# Patient Record
Sex: MALE | Race: WHITE | NOT HISPANIC OR LATINO | URBAN - METROPOLITAN AREA
[De-identification: names, ages, dates, MRNs, and addresses within clinical notes are randomized per-mention and may not be internally consistent; named-entity substitution may affect disease eponyms.]

---

## 2020-07-14 ENCOUNTER — TELEPHONE (OUTPATIENT)
Dept: PHYSICAL THERAPY | Facility: CLINIC | Age: 30
End: 2020-07-14

## 2021-01-13 ENCOUNTER — APPOINTMENT (OUTPATIENT)
Dept: GENERAL RADIOLOGY | Facility: HOSPITAL | Age: 31
End: 2021-01-13

## 2021-01-13 ENCOUNTER — HOSPITAL ENCOUNTER (EMERGENCY)
Facility: HOSPITAL | Age: 31
Discharge: HOME | End: 2021-01-13
Attending: EMERGENCY MEDICINE | Admitting: EMERGENCY MEDICINE

## 2021-01-13 VITALS
HEIGHT: 74 IN | RESPIRATION RATE: 14 BRPM | HEART RATE: 70 BPM | SYSTOLIC BLOOD PRESSURE: 126 MMHG | OXYGEN SATURATION: 96 % | BODY MASS INDEX: 33.5 KG/M2 | DIASTOLIC BLOOD PRESSURE: 60 MMHG | WEIGHT: 261 LBS | TEMPERATURE: 98 F

## 2021-01-13 DIAGNOSIS — Z77.9 EXPOSURE TO RESPIRATORY IRRITANT: ICD-10-CM

## 2021-01-13 DIAGNOSIS — J40 BRONCHITIS: Primary | ICD-10-CM

## 2021-01-13 LAB
BASOPHILS # BLD AUTO: 0.1 10*3/MM3 (ref 0–0.2)
BASOPHILS NFR BLD AUTO: 0.4 % (ref 0–1.5)
DEPRECATED RDW RBC AUTO: 38.9 FL (ref 37–54)
EOSINOPHIL # BLD AUTO: 0.1 10*3/MM3 (ref 0–0.4)
EOSINOPHIL NFR BLD AUTO: 0.8 % (ref 0.3–6.2)
ERYTHROCYTE [DISTWIDTH] IN BLOOD BY AUTOMATED COUNT: 12.6 % (ref 12.3–15.4)
HCT VFR BLD AUTO: 44 % (ref 37.5–51)
HGB BLD-MCNC: 15.2 G/DL (ref 13–17.7)
LYMPHOCYTES # BLD AUTO: 1.5 10*3/MM3 (ref 0.7–3.1)
LYMPHOCYTES NFR BLD AUTO: 12.2 % (ref 19.6–45.3)
MCH RBC QN AUTO: 30.9 PG (ref 26.6–33)
MCHC RBC AUTO-ENTMCNC: 34.5 G/DL (ref 31.5–35.7)
MCV RBC AUTO: 89.6 FL (ref 79–97)
MONOCYTES # BLD AUTO: 1 10*3/MM3 (ref 0.1–0.9)
MONOCYTES NFR BLD AUTO: 8.3 % (ref 5–12)
NEUTROPHILS NFR BLD AUTO: 78.3 % (ref 42.7–76)
NEUTROPHILS NFR BLD AUTO: 9.5 10*3/MM3 (ref 1.7–7)
NRBC BLD AUTO-RTO: 0.1 /100 WBC (ref 0–0.2)
PLATELET # BLD AUTO: 306 10*3/MM3 (ref 140–450)
PMV BLD AUTO: 7.6 FL (ref 6–12)
RBC # BLD AUTO: 4.91 10*6/MM3 (ref 4.14–5.8)
SARS-COV-2 RNA PNL SPEC NAA+PROBE: NOT DETECTED
WBC # BLD AUTO: 12.2 10*3/MM3 (ref 3.4–10.8)
WHOLE BLOOD HOLD SPECIMEN: NORMAL
WHOLE BLOOD HOLD SPECIMEN: NORMAL

## 2021-01-13 PROCEDURE — 93005 ELECTROCARDIOGRAM TRACING: CPT

## 2021-01-13 PROCEDURE — 99283 EMERGENCY DEPT VISIT LOW MDM: CPT

## 2021-01-13 PROCEDURE — 96374 THER/PROPH/DIAG INJ IV PUSH: CPT

## 2021-01-13 PROCEDURE — 85025 COMPLETE CBC W/AUTO DIFF WBC: CPT | Performed by: EMERGENCY MEDICINE

## 2021-01-13 PROCEDURE — 93005 ELECTROCARDIOGRAM TRACING: CPT | Performed by: EMERGENCY MEDICINE

## 2021-01-13 PROCEDURE — 94640 AIRWAY INHALATION TREATMENT: CPT

## 2021-01-13 PROCEDURE — 87635 SARS-COV-2 COVID-19 AMP PRB: CPT | Performed by: EMERGENCY MEDICINE

## 2021-01-13 PROCEDURE — 25010000002 METHYLPREDNISOLONE PER 125 MG: Performed by: EMERGENCY MEDICINE

## 2021-01-13 PROCEDURE — 71045 X-RAY EXAM CHEST 1 VIEW: CPT

## 2021-01-13 RX ORDER — METHYLPREDNISOLONE SODIUM SUCCINATE 125 MG/2ML
125 INJECTION, POWDER, LYOPHILIZED, FOR SOLUTION INTRAMUSCULAR; INTRAVENOUS ONCE
Status: COMPLETED | OUTPATIENT
Start: 2021-01-13 | End: 2021-01-13

## 2021-01-13 RX ORDER — PREDNISONE 10 MG/1
30 TABLET ORAL DAILY
Qty: 15 TABLET | Refills: 0 | Status: SHIPPED | OUTPATIENT
Start: 2021-01-13

## 2021-01-13 RX ORDER — IPRATROPIUM BROMIDE AND ALBUTEROL SULFATE 2.5; .5 MG/3ML; MG/3ML
3 SOLUTION RESPIRATORY (INHALATION) ONCE
Status: DISCONTINUED | OUTPATIENT
Start: 2021-01-13 | End: 2021-01-13

## 2021-01-13 RX ORDER — ALBUTEROL SULFATE 90 UG/1
2 AEROSOL, METERED RESPIRATORY (INHALATION) EVERY 6 HOURS PRN
Qty: 8 G | Refills: 0 | Status: SHIPPED | OUTPATIENT
Start: 2021-01-13

## 2021-01-13 RX ORDER — PREDNISONE 10 MG/1
30 TABLET ORAL DAILY
Qty: 15 TABLET | Refills: 0 | Status: SHIPPED | OUTPATIENT
Start: 2021-01-13 | End: 2021-01-13 | Stop reason: SDUPTHER

## 2021-01-13 RX ORDER — ALBUTEROL SULFATE 90 UG/1
2 AEROSOL, METERED RESPIRATORY (INHALATION) ONCE
Status: COMPLETED | OUTPATIENT
Start: 2021-01-13 | End: 2021-01-13

## 2021-01-13 RX ORDER — AZITHROMYCIN 500 MG/1
500 TABLET, FILM COATED ORAL DAILY
Qty: 3 TABLET | Refills: 0 | Status: SHIPPED | OUTPATIENT
Start: 2021-01-13

## 2021-01-13 RX ADMIN — ALBUTEROL SULFATE 2 PUFF: 90 AEROSOL, METERED RESPIRATORY (INHALATION) at 20:18

## 2021-01-13 RX ADMIN — METHYLPREDNISOLONE SODIUM SUCCINATE 125 MG: 125 INJECTION, POWDER, FOR SOLUTION INTRAMUSCULAR; INTRAVENOUS at 19:57

## 2021-01-14 NOTE — ED PROVIDER NOTES
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  He states he has developed a cough productive of brown sputum.  He reports no blood.  He reports he has had subjective fever and chills today.  He states he has had some wheezing.  He states he has had similar symptoms in the past with dust exposure.  He reports that his had no leg pain or swelling.  He states he does not have pleuritic chest discomfort.  Reports no change in taste or smell.  He denies diarrhea          Review of Systems   HENT: Negative for sore throat and trouble swallowing.    Respiratory: Positive for cough, shortness of breath and wheezing.    Cardiovascular: Negative for palpitations and leg swelling.   Allergic/Immunologic: Negative for immunocompromised state.   Hematological: Bruises/bleeds easily.   All other systems reviewed and are negative.      No past medical history on file.    No Known Allergies    Past Surgical History:   Procedure Laterality Date   • ABDOMINAL SURGERY     • HERNIA REPAIR         No family history on file.    Social History     Socioeconomic History   • Marital status:      Spouse name: Not on file   • Number of children: Not on file   • Years of education: Not on file   • Highest education level: Not on file   Tobacco Use   • Smoking status: Never Smoker   • Smokeless tobacco: Never Used   Substance and Sexual Activity   • Alcohol use: Not Currently   • Drug use: Defer   • Sexual activity: Defer           Objective   Physical Exam  Alert Ovid Coma Scale 15   HEENT: Pupils equal and reactive to light. Conjunctivae are not injected. normal tympanic membranes. Oropharynx and nares are normal.   Neck: Supple. Midline trachea. No JVD. No goiter.   Chest: Extensive rhonchi bilaterally and equal breath sounds bilaterally regular rate and rhythm without murmur or rub.   Abdomen: Positive bowel sounds nontender nondistended. No rebound or peritoneal signs.  No CVA tenderness.   Extremities no clubbing cyanosis or edema motor sensory exam is normal the full range of motion is intact   skin: Warm and dry, no rashes or petechia.   Lymphatic: No regional lymphadenopathy. No calf pain, swelling or Lc's sign    Procedures           ED Course  ED Course as of Jan 13 2121 Wed Jan 13, 2021 2116 I examined the patient using the appropriate personal protective equipment.          [TH]      ED Course User Index  [TH] Karl Vega MD                                   Labs Reviewed   CBC WITH AUTO DIFFERENTIAL - Abnormal; Notable for the following components:       Result Value    WBC 12.20 (*)     Neutrophil % 78.3 (*)     Lymphocyte % 12.2 (*)     Neutrophils, Absolute 9.50 (*)     Monocytes, Absolute 1.00 (*)     All other components within normal limits   COVID-19,[facility] NO TRANSPORT MEDIA 2 HR TAT - Normal    Narrative:     Fact sheet for providers: https://www.fda.gov/media/809892/download     Fact sheet for patients: https://www.fda.gov/media/477333/download    Test performed by PCR.    Consider negative results in combination with clinical observations, patient history, and epidemiological information.   RAINBOW DRAW    Narrative:     The following orders were created for panel order [place] Draw.  Procedure                               Abnormality         Status                     ---------                               -----------         ------                     Light Blue Top[[id number]]                                   Final result               Green Top (Gel)[[id number]]                                  Final result               Lavender Top[[id number]]                                     Final result               Gold Top - SST[[id number]]                                   Final result                 Please view results for these tests on the individual orders.   LIGHT BLUE TOP   GREEN TOP   LAVENDER TOP   GOLD TOP - SST   CBC  AND DIFFERENTIAL    Narrative:     The following orders were created for panel order CBC & Differential.  Procedure                               Abnormality         Status                     ---------                               -----------         ------                     CBC Auto Differential[[id number]]        Abnormal            Final result                 Please view results for these tests on the individual orders.     Medications   cefTRIAXone (ROCEPHIN) in FI 1 gram/10ml IV PUSH syringe (has no administration in time range)   methylPREDNISolone sodium succinate (SOLU-Medrol) injection 125 mg (125 mg Intravenous Given 1/13/21 1957)   albuterol sulfate HFA (PROVENTIL HFA;VENTOLIN HFA;PROAIR HFA) inhaler 2 puff (2 puffs Inhalation Given 1/13/21 2018)     Xr Chest 1 View    Result Date: 1/13/2021  1.  No evidence of active disease.   Electronically Signed By-Lizet Wall MD On:1/13/2021 8:17 PM This report was finalized on [id number] by  Lizet Wall MD.            MDM  Number of Diagnoses or Management Options     Amount and/or Complexity of Data Reviewed  Clinical lab tests: reviewed  Tests in the radiology section of CPT®: reviewed  Independent visualization of images, tracings, or specimens: yes    Risk of Complications, Morbidity, and/or Mortality  Presenting problems: high  Diagnostic procedures: high  Management options: high  General comments: Patient be discharged with a prescription albuterol inhaler, prednisone, Zithromax.  The patient was stable at discharge and vocalized understanding of discharge instructions and warnings        Final diagnoses:   Bronchitis   Exposure to respiratory irritant            Karl Vega MD  01/13/21 2121

## 2021-01-14 NOTE — DISCHARGE INSTRUCTIONS
Rest next 2 days, no work  Avoid cold exposure next 2 days  Plenty of fluids  Tylenol or ibuprofen for any fever and discomfort  Medication as directed

## 2021-01-15 LAB — QT INTERVAL: 379 MS

## 2024-06-09 ENCOUNTER — APPOINTMENT (OUTPATIENT)
Dept: GENERAL RADIOLOGY | Facility: HOSPITAL | Age: 34
End: 2024-06-09
Payer: COMMERCIAL

## 2024-06-09 ENCOUNTER — HOSPITAL ENCOUNTER (EMERGENCY)
Facility: HOSPITAL | Age: 34
Discharge: HOME | End: 2024-06-10
Admitting: EMERGENCY MEDICINE
Payer: COMMERCIAL

## 2024-06-09 VITALS
HEART RATE: 64 BPM | OXYGEN SATURATION: 100 % | TEMPERATURE: 98.3 F | HEIGHT: 74 IN | WEIGHT: 272.49 LBS | BODY MASS INDEX: 34.97 KG/M2 | SYSTOLIC BLOOD PRESSURE: 128 MMHG | DIASTOLIC BLOOD PRESSURE: 75 MMHG | RESPIRATION RATE: 18 BRPM

## 2024-06-09 DIAGNOSIS — M54.50 ACUTE BILATERAL LOW BACK PAIN WITHOUT SCIATICA: Primary | ICD-10-CM

## 2024-06-09 PROCEDURE — 96372 THER/PROPH/DIAG INJ SC/IM: CPT

## 2024-06-09 PROCEDURE — 99283 EMERGENCY DEPT VISIT LOW MDM: CPT

## 2024-06-09 PROCEDURE — 72110 X-RAY EXAM L-2 SPINE 4/>VWS: CPT

## 2024-06-09 PROCEDURE — 25010000002 KETOROLAC TROMETHAMINE PER 15 MG

## 2024-06-09 PROCEDURE — 63710000001 PREDNISONE PER 5 MG

## 2024-06-09 RX ORDER — KETOROLAC TROMETHAMINE 30 MG/ML
15 INJECTION, SOLUTION INTRAMUSCULAR; INTRAVENOUS ONCE
Status: COMPLETED | OUTPATIENT
Start: 2024-06-09 | End: 2024-06-09

## 2024-06-09 RX ORDER — METHOCARBAMOL 500 MG/1
500 TABLET, FILM COATED ORAL ONCE
Status: COMPLETED | OUTPATIENT
Start: 2024-06-09 | End: 2024-06-09

## 2024-06-09 RX ADMIN — KETOROLAC TROMETHAMINE 15 MG: 30 INJECTION, SOLUTION INTRAMUSCULAR at 23:48

## 2024-06-09 RX ADMIN — PREDNISONE 60 MG: 50 TABLET ORAL at 23:07

## 2024-06-09 RX ADMIN — METHOCARBAMOL 500 MG: 500 TABLET ORAL at 23:07

## 2024-06-10 RX ORDER — PREDNISONE 20 MG/1
20 TABLET ORAL DAILY
Qty: 5 TABLET | Refills: 0 | Status: SHIPPED | OUTPATIENT
Start: 2024-06-09 | End: 2024-06-14

## 2024-06-10 RX ORDER — DICLOFENAC SODIUM 75 MG/1
75 TABLET, DELAYED RELEASE ORAL 2 TIMES DAILY PRN
Qty: 10 TABLET | Refills: 0 | Status: SHIPPED | OUTPATIENT
Start: 2024-06-09 | End: 2024-06-14

## 2024-06-10 RX ORDER — METHOCARBAMOL 500 MG/1
500 TABLET, FILM COATED ORAL 3 TIMES DAILY PRN
Qty: 9 TABLET | Refills: 0 | Status: SHIPPED | OUTPATIENT
Start: 2024-06-09 | End: 2024-06-12

## 2024-06-10 RX ORDER — LIDOCAINE 50 MG/G
1 PATCH TOPICAL EVERY 24 HOURS
Qty: 5 PATCH | Refills: 0 | Status: SHIPPED | OUTPATIENT
Start: 2024-06-10 | End: 2024-06-15

## 2024-06-10 NOTE — ED PROVIDER NOTES
Subjective   History of Present Illness  34-year-old male presents the ED with complaints of bilateral low back pain for 2 days.  He states he was doing work with pushing and pulling motion on Friday and woke up Saturday with this back pain.  States it radiates into the hips, denies any pain down the legs or any numbness or tingling.  Also denies any loss of bowel or bladder function.  He states about 8 months ago he was told that he strained the muscles in his back and was started on a steroid Dosepak which did help with the pain.  States has been trying to take ibuprofen and using lidocaine patches at home without relief        Review of Systems   Musculoskeletal:  Positive for back pain.       No past medical history on file.    No Known Allergies    Past Surgical History:   Procedure Laterality Date    ABDOMINAL SURGERY      HERNIA REPAIR         No family history on file.    Social History     Socioeconomic History    Marital status:    Tobacco Use    Smoking status: Never    Smokeless tobacco: Never   Substance and Sexual Activity    Alcohol use: Not Currently    Drug use: Defer    Sexual activity: Defer           Objective   Physical Exam  Vitals and nursing note reviewed.   Constitutional:       Appearance: Normal appearance.   HENT:      Head: Normocephalic and atraumatic.   Cardiovascular:      Rate and Rhythm: Normal rate and regular rhythm.      Pulses: Normal pulses.      Heart sounds: Normal heart sounds.   Pulmonary:      Effort: Pulmonary effort is normal.      Breath sounds: Normal breath sounds.   Abdominal:      General: Bowel sounds are normal.      Palpations: Abdomen is soft.   Musculoskeletal:      Cervical back: Normal range of motion.      Comments: Patient has decreased range of motion due to pain, denies any weakness.  Also denies numbness tingling saddle anesthesia.  No step-off felt   Skin:     General: Skin is warm and dry.      Capillary Refill: Capillary refill takes less than  "2 seconds.   Neurological:      Mental Status: He is alert and oriented to person, place, and time.   Psychiatric:         Mood and Affect: Mood normal.         Behavior: Behavior normal.         Thought Content: Thought content normal.         Judgment: Judgment normal.         Procedures           ED Course      /75 (BP Location: Left arm, Patient Position: Sitting)   Pulse 64   Temp 98.3 °F (36.8 °C) (Oral)   Resp 18   Ht 188 cm (74\")   Wt 124 kg (272 lb 7.8 oz)   SpO2 100%   BMI 34.99 kg/m²   Labs Reviewed - No data to display  Medications   ketorolac (TORADOL) injection 15 mg (15 mg Intramuscular Given 6/9/24 1140)   methocarbamol (ROBAXIN) tablet 500 mg (500 mg Oral Given 6/9/24 2307)   predniSONE (DELTASONE) tablet 60 mg (60 mg Oral Given 6/9/24 2307)     XR Spine Lumbar Complete 4+VW    Result Date: 6/9/2024  Impression: Mild levoscoliosis. No acute abnormality. Electronically Signed: Balta Maxwell MD  6/9/2024 11:32 PM EDT  Workstation ID: [id number]                                          Medical Decision Making  Patient presented to the ED with complaints of low back pain after excessive exercise on 6/7/2024.  Patient states he had a back strain that felt similar to this about 8 months ago.  X-ray of the lumbar spine was obtained and independently interpreted by the radiologist ER physician as mild levoscoliosis but no acute abnormality.  Patient did not have any numbness or tingling, saddle anesthesia, loss of bowel or bladder function, cauda equina syndrome not felt present.  Also felt no step-off on assessment.  Patient was given Toradol injection, Robaxin, and prednisone.  On reassessment patient states he feels a little better but not significantly better.  Will give a lidocaine patch prior to discharge.  Patient was given prescription for Voltaren, prednisone, Robaxin, and lidocaine patches.  Also advised patient to use heat to his back for no more than 20 minutes at 1 time and to also " not mix any other NSAIDs with the Voltaren.  Also advised patient to follow-up with Ortho if pain persist along with primary care.  Patient verbalized understanding was agreeable disposition.  Patient states he feels able to drive.    I discussed findings with patient who voices understanding of discharge instructions, signs and symptoms requiring return to ED; discharged improved and in stable condition with follow up for re-evaluation.  This document is intended for medical expert use only. Reading of this document by patients and/or patient's family without participating medical staff guidance may result in misinterpretation and unintended morbidity.  Any interpretation of such data is the responsibility of the patient and/or family member responsible for the patient in concert with their primary or specialist providers, not to be left for sources of online searches such as Rawporter, IntelliGene[employer] or similar queries. Relying on these approaches to knowledge may result in misinterpretation, misguided goals of care and even death should patients or family members try recommendations outside of the realm of professional medical care in a supervised inpatient environment.     Problems Addressed:  Acute bilateral low back pain without sciatica: complicated acute illness or injury    Amount and/or Complexity of Data Reviewed  Radiology: ordered and independent interpretation performed. Decision-making details documented in ED Course.    Risk  OTC drugs.  Prescription drug management.        Final diagnoses:   Acute bilateral low back pain without sciatica       ED Disposition  ED Disposition       ED Disposition   Discharge    Condition   Stable    Comment   --               family connections  [phone]  Schedule an appointment as soon as possible for a visit       Israel Bowen MD  [facility] [place] [address]  [phone]    Schedule an appointment as soon as possible for a visit             Medication List        New Prescriptions      diclofenac 75 MG EC tablet  Commonly known as: VOLTAREN  Take 1 tablet by mouth 2 (Two) Times a Day As Needed (back pain) for up to 5 days.     lidocaine 5 %  Commonly known as: LIDODERM  Place 1 patch on the skin as directed by provider Daily for 5 days. Apply to low back. Remove & Discard patch within 12 hours or as directed by MD     methocarbamol 500 MG tablet  Commonly known as: ROBAXIN  Take 1 tablet by mouth 3 (Three) Times a Day As Needed for Muscle Spasms for up to 3 days.            Changed      predniSONE 20 MG tablet  Commonly known as: DELTASONE  Take 1 tablet by mouth Daily for 5 days.  What changed:   medication strength  how much to take               Where to Get Your Medications        These medications were sent to [id number] - [place], [place] - [address]  - [phone]  - [phone] FX  [address] [facility] [place] [address]      Phone: [phone]   diclofenac 75 MG EC tablet  lidocaine 5 %  methocarbamol 500 MG tablet  predniSONE 20 MG tablet            Dillon éPrez, EDDA  06/10/24 0033

## 2024-06-10 NOTE — DISCHARGE INSTRUCTIONS
Follow-up with your primary care, may use above number to establish with a new primary care provider if you are not able to get into the office that you currently attend.  May also follow-up with Ortho if pain persists    Take prednisone until gone.  May also take Robaxin and Voltaren as needed for pain and discomfort, use lidocaine patches.  May also use heat to the back for no more than 20 minutes at 1 time.  Do not mix ibuprofen Motrin Aleve or any other NSAIDs with the Voltaren.    Return with any new or worsening symptoms

## 2024-07-11 ENCOUNTER — OFFICE VISIT (OUTPATIENT)
Dept: ORTHOPEDIC SURGERY | Facility: CLINIC | Age: 34
End: 2024-07-11
Payer: COMMERCIAL

## 2024-07-11 VITALS — BODY MASS INDEX: 34.91 KG/M2 | WEIGHT: 272 LBS | HEART RATE: 66 BPM | OXYGEN SATURATION: 96 % | HEIGHT: 74 IN

## 2024-07-11 DIAGNOSIS — R29.898 RIGHT ARM WEAKNESS: Primary | ICD-10-CM

## 2024-07-11 PROCEDURE — 99203 OFFICE O/P NEW LOW 30 MIN: CPT | Performed by: FAMILY MEDICINE

## 2024-07-11 RX ORDER — CYCLOBENZAPRINE HCL 10 MG
TABLET ORAL
COMMUNITY

## 2024-07-11 RX ORDER — ESCITALOPRAM OXALATE 10 MG/1
1 TABLET ORAL DAILY
COMMUNITY
Start: 2024-06-21

## 2024-07-11 RX ORDER — DICLOFENAC SODIUM 75 MG/1
75 TABLET, DELAYED RELEASE ORAL EVERY 12 HOURS SCHEDULED
COMMUNITY

## 2024-07-11 NOTE — PROGRESS NOTES
Primary Care Sports Medicine Office Visit Note    Patient ID: Charan Scott is a 34 y.o. male.    Chief Complaint:  Chief Complaint   Patient presents with    Right Arm - Pain, Initial Evaluation       History of Present Illness  The patient presents for evaluation of right arm pain.    Two years ago, during [place], he experienced a popping sensation and pain in his right arm while attempting to lift his wife. Despite the pain, he continued his work. His right arm was significantly weak, preventing him from lifting a 2 x 4. This condition lasted for six months. A year later, he began performing pull-ups to build his musculoskeletal system. Occasionally, he overworked himself, which resulted in his arm to give way. He experiences constant weakness in his right arm, which he can feel daily. Currently, his left arm is slightly sore due to workouts. He describes his right arm as feeling as if he has never fully worked out, limiting his activities. He feels a popping or clicking sensation when he pronates his arm. His pain is located on the inside of his elbow. He performed arm wrestling yesterday. He also experiences numbness in his right arm while sleeping, similar to his left arm. He also reports numbness and tingling in his pinky finger.       History reviewed. No pertinent past medical history.    Past Surgical History:   Procedure Laterality Date    ABDOMINAL SURGERY      HERNIA REPAIR         History reviewed. No pertinent family history.  Social History     Occupational History    Not on file   Tobacco Use    Smoking status: Never    Smokeless tobacco: Never   Vaping Use    Vaping status: Never Used   Substance and Sexual Activity    Alcohol use: Not Currently    Drug use: Defer    Sexual activity: Defer        Review of Systems:  Review of Systems   Constitutional:  Negative for activity change, fatigue and fever.   Musculoskeletal:  Positive for arthralgias.   Skin:  Negative for color change and rash.  "  Neurological:  Negative for numbness.     Objective:  Physical Exam  Pulse 66   Ht 188 cm (74\")   Wt 123 kg (272 lb)   SpO2 96%   BMI 34.92 kg/m²   Vitals and nursing note reviewed.   Constitutional:       General: he  is not in acute distress.     Appearance: he is well-developed. He is not diaphoretic.   HENT:      Head: Normocephalic and atraumatic.   Eyes:      Conjunctiva/sclera: Conjunctivae normal.   Pulmonary:      Effort: Pulmonary effort is normal. No respiratory distress.   Skin:     General: Skin is warm.      Capillary Refill: Capillary refill takes less than 2 seconds.   Neurological:      Mental Status: he is alert.     Ortho Exam:  Physical Exam  Right elbow exam feels full range of motion 0 to 120 degrees with biceps tendinopathy and antecubital fossa.  There is considerable tenderness [place] 4/5 flexion strength compared to contralateral arm however.  Medial and lateral epicondyles are nontender, nor is the olecranon posteriorly.  Extension strength the elbow is 5/5.    Results  No new imaging today.    Assessment & Plan    1. Partial right biceps tendon tear    The patient's description suggests a complete tear in his biceps, although it is unlikely. A partial tear in his biceps is suspected. If he can still perform pull-ups and curls, the likelihood of a complete tear is low. If a partial tear is identified, surgical intervention is not recommended. However, a PRP injection under ultrasound guidance could aid in healing of the tendon. An MRI of the right arm will be ordered. The MRI results will guide the subsequent treatment plan.  RTC after MRI.    Edgar ALEJANDRO \"Chance\" Hung HALL DO, CAQSM  07/11/24  17:36 EDT    Disclaimer: Please note that areas of this note were completed with computer voice recognition software.  Quite often unanticipated grammatical, syntax, homophones, and other interpretive errors are inadvertently transcribed by the computer software. Please excuse any errors that " have escaped final proofreading.    Patient or patient representative verbalized consent for the use of Ambient Listening during the visit with  Edgar Persaud II, DO for chart documentation. 17:36 EDT 07/11/24